# Patient Record
Sex: FEMALE | Race: WHITE | Employment: OTHER | URBAN - METROPOLITAN AREA
[De-identification: names, ages, dates, MRNs, and addresses within clinical notes are randomized per-mention and may not be internally consistent; named-entity substitution may affect disease eponyms.]

---

## 2017-04-12 ENCOUNTER — OFFICE VISIT (OUTPATIENT)
Dept: FAMILY MEDICINE CLINIC | Age: 77
End: 2017-04-12
Payer: MEDICARE

## 2017-04-12 VITALS
WEIGHT: 224 LBS | HEIGHT: 63 IN | SYSTOLIC BLOOD PRESSURE: 148 MMHG | RESPIRATION RATE: 18 BRPM | TEMPERATURE: 98.7 F | DIASTOLIC BLOOD PRESSURE: 80 MMHG | BODY MASS INDEX: 39.69 KG/M2 | HEART RATE: 96 BPM | OXYGEN SATURATION: 99 %

## 2017-04-12 DIAGNOSIS — R61 DIAPHORESIS: ICD-10-CM

## 2017-04-12 DIAGNOSIS — B02.9 HERPES ZOSTER WITHOUT COMPLICATION: Primary | ICD-10-CM

## 2017-04-12 DIAGNOSIS — R07.9 RIGHT-SIDED CHEST PAIN: ICD-10-CM

## 2017-04-12 PROCEDURE — G8417 CALC BMI ABV UP PARAM F/U: HCPCS | Performed by: NURSE PRACTITIONER

## 2017-04-12 PROCEDURE — G8399 PT W/DXA RESULTS DOCUMENT: HCPCS | Performed by: NURSE PRACTITIONER

## 2017-04-12 PROCEDURE — 1036F TOBACCO NON-USER: CPT | Performed by: NURSE PRACTITIONER

## 2017-04-12 PROCEDURE — 4040F PNEUMOC VAC/ADMIN/RCVD: CPT | Performed by: NURSE PRACTITIONER

## 2017-04-12 PROCEDURE — 1123F ACP DISCUSS/DSCN MKR DOCD: CPT | Performed by: NURSE PRACTITIONER

## 2017-04-12 PROCEDURE — 1090F PRES/ABSN URINE INCON ASSESS: CPT | Performed by: NURSE PRACTITIONER

## 2017-04-12 PROCEDURE — 3014F SCREEN MAMMO DOC REV: CPT | Performed by: NURSE PRACTITIONER

## 2017-04-12 PROCEDURE — 93000 ELECTROCARDIOGRAM COMPLETE: CPT | Performed by: NURSE PRACTITIONER

## 2017-04-12 PROCEDURE — G8427 DOCREV CUR MEDS BY ELIG CLIN: HCPCS | Performed by: NURSE PRACTITIONER

## 2017-04-12 PROCEDURE — 3017F COLORECTAL CA SCREEN DOC REV: CPT | Performed by: NURSE PRACTITIONER

## 2017-04-12 PROCEDURE — 99214 OFFICE O/P EST MOD 30 MIN: CPT | Performed by: NURSE PRACTITIONER

## 2017-04-12 RX ORDER — VALACYCLOVIR HYDROCHLORIDE 1 G/1
1000 TABLET, FILM COATED ORAL 2 TIMES DAILY
Qty: 14 TABLET | Refills: 0 | Status: SHIPPED | OUTPATIENT
Start: 2017-04-12 | End: 2017-04-19

## 2017-04-12 ASSESSMENT — ENCOUNTER SYMPTOMS
CHEST TIGHTNESS: 0
VOMITING: 0
ABDOMINAL PAIN: 0
NAUSEA: 0
SHORTNESS OF BREATH: 0
SORE THROAT: 0
DIARRHEA: 0
SINUS PRESSURE: 0
COUGH: 0
RHINORRHEA: 0
BACK PAIN: 1
TROUBLE SWALLOWING: 0

## 2018-06-15 ENCOUNTER — TELEPHONE (OUTPATIENT)
Dept: FAMILY MEDICINE CLINIC | Age: 78
End: 2018-06-15

## 2018-06-15 DIAGNOSIS — E03.9 ACQUIRED HYPOTHYROIDISM: Primary | ICD-10-CM

## 2018-06-18 ENCOUNTER — HOSPITAL ENCOUNTER (OUTPATIENT)
Age: 78
Setting detail: SPECIMEN
Discharge: HOME | End: 2018-06-18
Payer: COMMERCIAL

## 2018-06-18 DIAGNOSIS — E03.9 ACQUIRED HYPOTHYROIDISM: ICD-10-CM

## 2018-06-18 LAB
THYROXINE, FREE: 1.48 NG/DL (ref 0.93–1.7)
TSH SERPL DL<=0.05 MIU/L-ACNC: 16.51 MIU/L (ref 0.3–5)

## 2018-06-18 RX ORDER — LEVOTHYROXINE SODIUM 125 UG/1
1 CAPSULE ORAL DAILY
Qty: 30 CAPSULE | Refills: 3 | Status: SHIPPED | OUTPATIENT
Start: 2018-06-18 | End: 2018-11-16 | Stop reason: SDUPTHER

## 2018-06-20 ENCOUNTER — OFFICE VISIT (OUTPATIENT)
Dept: FAMILY MEDICINE CLINIC | Age: 78
End: 2018-06-20
Payer: MEDICARE

## 2018-06-20 VITALS
BODY MASS INDEX: 41.11 KG/M2 | SYSTOLIC BLOOD PRESSURE: 137 MMHG | OXYGEN SATURATION: 98 % | WEIGHT: 232 LBS | HEART RATE: 77 BPM | HEIGHT: 63 IN | TEMPERATURE: 98.5 F | DIASTOLIC BLOOD PRESSURE: 68 MMHG

## 2018-06-20 DIAGNOSIS — Z00.00 ROUTINE GENERAL MEDICAL EXAMINATION AT A HEALTH CARE FACILITY: ICD-10-CM

## 2018-06-20 DIAGNOSIS — Z13.220 SCREENING FOR LIPID DISORDERS: ICD-10-CM

## 2018-06-20 DIAGNOSIS — Z87.898 HISTORY OF PREDIABETES: ICD-10-CM

## 2018-06-20 DIAGNOSIS — E03.9 ACQUIRED HYPOTHYROIDISM: ICD-10-CM

## 2018-06-20 DIAGNOSIS — M25.562 ACUTE PAIN OF LEFT KNEE: Primary | ICD-10-CM

## 2018-06-20 PROCEDURE — 99213 OFFICE O/P EST LOW 20 MIN: CPT | Performed by: NURSE PRACTITIONER

## 2018-06-20 ASSESSMENT — PATIENT HEALTH QUESTIONNAIRE - PHQ9
2. FEELING DOWN, DEPRESSED OR HOPELESS: 0
SUM OF ALL RESPONSES TO PHQ9 QUESTIONS 1 & 2: 0
SUM OF ALL RESPONSES TO PHQ QUESTIONS 1-9: 0
1. LITTLE INTEREST OR PLEASURE IN DOING THINGS: 0

## 2018-06-20 ASSESSMENT — ENCOUNTER SYMPTOMS
SHORTNESS OF BREATH: 0
COLOR CHANGE: 0

## 2018-06-27 RX ORDER — IBUPROFEN 800 MG/1
800 TABLET ORAL EVERY 12 HOURS PRN
Qty: 60 TABLET | Refills: 0 | Status: SHIPPED | OUTPATIENT
Start: 2018-06-27 | End: 2019-10-28 | Stop reason: SDUPTHER

## 2018-06-27 RX ORDER — IBUPROFEN 800 MG/1
800 TABLET ORAL EVERY 12 HOURS PRN
Qty: 60 TABLET | Refills: 0 | Status: CANCELLED | OUTPATIENT
Start: 2018-06-27

## 2018-11-13 ENCOUNTER — TELEPHONE (OUTPATIENT)
Dept: FAMILY MEDICINE CLINIC | Age: 78
End: 2018-11-13

## 2018-11-14 ENCOUNTER — HOSPITAL ENCOUNTER (OUTPATIENT)
Age: 78
Setting detail: SPECIMEN
Discharge: HOME | End: 2018-11-14
Payer: MEDICARE

## 2018-11-14 DIAGNOSIS — Z87.898 HISTORY OF PREDIABETES: ICD-10-CM

## 2018-11-14 DIAGNOSIS — E03.9 ACQUIRED HYPOTHYROIDISM: ICD-10-CM

## 2018-11-14 DIAGNOSIS — Z00.00 ROUTINE GENERAL MEDICAL EXAMINATION AT A HEALTH CARE FACILITY: ICD-10-CM

## 2018-11-14 LAB
ALBUMIN SERPL-MCNC: 4.3 G/DL (ref 3.5–5.2)
ALBUMIN/GLOBULIN RATIO: 1.3 (ref 1–2.5)
ALP BLD-CCNC: 82 U/L (ref 35–104)
ALT SERPL-CCNC: 12 U/L (ref 5–33)
ANION GAP SERPL CALCULATED.3IONS-SCNC: 13 MMOL/L (ref 9–17)
AST SERPL-CCNC: 15 U/L
BILIRUB SERPL-MCNC: 1.17 MG/DL (ref 0.3–1.2)
BUN BLDV-MCNC: 9 MG/DL (ref 8–23)
BUN/CREAT BLD: ABNORMAL (ref 9–20)
CALCIUM SERPL-MCNC: 9.2 MG/DL (ref 8.6–10.4)
CHLORIDE BLD-SCNC: 102 MMOL/L (ref 98–107)
CHOLESTEROL/HDL RATIO: 4
CHOLESTEROL: 165 MG/DL
CO2: 25 MMOL/L (ref 20–31)
CREAT SERPL-MCNC: 0.59 MG/DL (ref 0.5–0.9)
ESTIMATED AVERAGE GLUCOSE: 126 MG/DL
GFR AFRICAN AMERICAN: >60 ML/MIN
GFR NON-AFRICAN AMERICAN: >60 ML/MIN
GFR SERPL CREATININE-BSD FRML MDRD: ABNORMAL ML/MIN/{1.73_M2}
GFR SERPL CREATININE-BSD FRML MDRD: ABNORMAL ML/MIN/{1.73_M2}
GLUCOSE BLD-MCNC: 116 MG/DL (ref 70–99)
HBA1C MFR BLD: 6 % (ref 4–6)
HCT VFR BLD CALC: 43.7 % (ref 36.3–47.1)
HDLC SERPL-MCNC: 41 MG/DL
HEMOGLOBIN: 14.3 G/DL (ref 11.9–15.1)
LDL CHOLESTEROL: 105 MG/DL (ref 0–130)
MCH RBC QN AUTO: 31.2 PG (ref 25.2–33.5)
MCHC RBC AUTO-ENTMCNC: 32.7 G/DL (ref 28.4–34.8)
MCV RBC AUTO: 95.4 FL (ref 82.6–102.9)
NRBC AUTOMATED: 0 PER 100 WBC
PDW BLD-RTO: 13.1 % (ref 11.8–14.4)
PLATELET # BLD: 236 K/UL (ref 138–453)
PMV BLD AUTO: 11.2 FL (ref 8.1–13.5)
POTASSIUM SERPL-SCNC: 4 MMOL/L (ref 3.7–5.3)
RBC # BLD: 4.58 M/UL (ref 3.95–5.11)
SODIUM BLD-SCNC: 140 MMOL/L (ref 135–144)
T4 TOTAL: 7.5 UG/DL (ref 4.5–12)
TOTAL PROTEIN: 7.6 G/DL (ref 6.4–8.3)
TRIGL SERPL-MCNC: 96 MG/DL
TSH SERPL DL<=0.05 MIU/L-ACNC: 4.64 MIU/L (ref 0.3–5)
VLDLC SERPL CALC-MCNC: NORMAL MG/DL (ref 1–30)
WBC # BLD: 5.9 K/UL (ref 3.5–11.3)

## 2018-11-16 ENCOUNTER — OFFICE VISIT (OUTPATIENT)
Dept: FAMILY MEDICINE CLINIC | Age: 78
End: 2018-11-16
Payer: MEDICARE

## 2018-11-16 VITALS
BODY MASS INDEX: 41.11 KG/M2 | OXYGEN SATURATION: 98 % | TEMPERATURE: 97.1 F | HEART RATE: 73 BPM | SYSTOLIC BLOOD PRESSURE: 132 MMHG | WEIGHT: 232 LBS | DIASTOLIC BLOOD PRESSURE: 65 MMHG | HEIGHT: 63 IN

## 2018-11-16 DIAGNOSIS — E03.9 ACQUIRED HYPOTHYROIDISM: ICD-10-CM

## 2018-11-16 PROCEDURE — 99213 OFFICE O/P EST LOW 20 MIN: CPT | Performed by: NURSE PRACTITIONER

## 2018-11-16 RX ORDER — LEVOTHYROXINE SODIUM 125 UG/1
1 CAPSULE ORAL DAILY
Qty: 90 CAPSULE | Refills: 3 | Status: SHIPPED | OUTPATIENT
Start: 2018-11-16 | End: 2019-10-28 | Stop reason: SDUPTHER

## 2018-11-16 ASSESSMENT — ENCOUNTER SYMPTOMS
NAUSEA: 0
COUGH: 0
VOMITING: 0
CHEST TIGHTNESS: 0
SHORTNESS OF BREATH: 0
ABDOMINAL PAIN: 0

## 2018-11-16 NOTE — PROGRESS NOTES
[facility] SPECIALTY \[facility]\"" - [place]  [address]  [place], [address]  [phone]      Toma Damon is a 70 y.o. female who presents today for her  medicalconditions/complaints as noted below. Toma Damon is c/o of Hypothyroidism (review labs. declines MMG, pneumo (waiver signed))  . HPI:   Pt here to review recent labs. She is feeling well with no other health concerns. She declines vaccines, mammogram. She has been tolerating her thyroid medication well with no SE> she is trying to work on weight loss and increase her water intake. Past Medical History:   Diagnosis Date    Hypothyroidism       Past Surgical History:   Procedure Laterality Date    CARPAL TUNNEL RELEASE      CHOLECYSTECTOMY      ECTOPIC PREGNANCY SURGERY       Family History   Problem Relation Age of Onset    No Known Problems Mother     High Blood Pressure Father     Arthritis Father      Social History   Substance Use Topics    Smoking status: Never Smoker    Smokeless tobacco: Never Used    Alcohol use No      Current Outpatient Prescriptions   Medication Sig Dispense Refill    Levothyroxine Sodium 125 MCG CAPS Take 1 tablet by mouth Daily 90 capsule 3    ibuprofen (ADVIL;MOTRIN) 800 MG tablet Take 1 tablet by mouth every 12 hours as needed for Pain (with food) 60 tablet 0     No current facility-administered medications for this visit. Allergies   Allergen Reactions    Valium [Diazepam]        Health Maintenance   Topic Date Due    DTaP/Tdap/Td vaccine (1 - Tdap) 07/06/1961    Shingles Vaccine (1 of 2 - 2 Dose Series) 07/06/1992    Pneumococcal low/med risk (1 of 2 - PCV13) 07/06/2007    Breast cancer screen  07/06/2017    Flu vaccine (1) 09/01/2018    TSH testing  11/14/2019    DEXA (modify frequency per FRAX score)  Addressed       Subjective:      Review of Systems   Constitutional: Negative for appetite change, chills, diaphoresis, fatigue, fever and unexpected weight change.    Eyes: Negative for visual disturbance. Respiratory: Negative for cough, chest tightness and shortness of breath. Cardiovascular: Positive for leg swelling (bilateral and chronic). Negative for chest pain and palpitations. Gastrointestinal: Negative for abdominal pain, nausea and vomiting. Endocrine: Negative for cold intolerance and heat intolerance. Genitourinary: Negative for difficulty urinating. Musculoskeletal: Positive for arthralgias (chronic left knee pain). Skin: Negative for rash and wound. Neurological: Negative for dizziness, light-headedness and headaches. Hematological: Negative for adenopathy. Psychiatric/Behavioral: Negative for dysphoric mood and sleep disturbance. The patient is not nervous/anxious. Objective:     Physical Exam   Constitutional: She is oriented to person, place, and time. She appears well-developed and well-nourished. No distress. HENT:   Head: Normocephalic and atraumatic. Neck: Neck supple. No JVD present. Cardiovascular: Normal rate, regular rhythm, normal heart sounds and intact distal pulses. 2+ BLE edema   Pulmonary/Chest: Effort normal and breath sounds normal.   Lymphadenopathy:     She has no cervical adenopathy. Neurological: She is alert and oriented to person, place, and time. Skin: Skin is warm and dry. She is not diaphoretic. Psychiatric: She has a normal mood and affect. Her behavior is normal. Judgment and thought content normal.   Nursing note and vitals reviewed. Assessment:       Diagnosis Orders   1.  Acquired hypothyroidism       Lab Results   Component Value Date    TSH 4.64 11/14/2018       Chemistry        Component Value Date/Time     11/14/2018 1059    K 4.0 11/14/2018 1059     11/14/2018 1059    CO2 25 11/14/2018 1059    BUN 9 11/14/2018 1059    CREATININE 0.59 11/14/2018 1059        Component Value Date/Time    CALCIUM 9.2 11/14/2018 1059    ALKPHOS 82 11/14/2018 1059    AST 15 11/14/2018 1059    ALT 12

## 2019-10-18 ENCOUNTER — TELEPHONE (OUTPATIENT)
Dept: FAMILY MEDICINE CLINIC | Age: 79
End: 2019-10-18

## 2019-10-18 DIAGNOSIS — Z13.220 SCREENING FOR LIPOID DISORDERS: ICD-10-CM

## 2019-10-18 DIAGNOSIS — Z12.5 SPECIAL SCREENING FOR MALIGNANT NEOPLASM OF PROSTATE: ICD-10-CM

## 2019-10-18 DIAGNOSIS — R73.03 PREDIABETES: ICD-10-CM

## 2019-10-18 DIAGNOSIS — Z00.00 ROUTINE GENERAL MEDICAL EXAMINATION AT A HEALTH CARE FACILITY: ICD-10-CM

## 2019-10-18 DIAGNOSIS — E03.9 ACQUIRED HYPOTHYROIDISM: Primary | ICD-10-CM

## 2019-10-24 ENCOUNTER — HOSPITAL ENCOUNTER (OUTPATIENT)
Age: 79
Setting detail: SPECIMEN
Discharge: HOME | End: 2019-10-24
Payer: MEDICARE

## 2019-10-24 DIAGNOSIS — E03.9 ACQUIRED HYPOTHYROIDISM: ICD-10-CM

## 2019-10-24 DIAGNOSIS — Z13.220 SCREENING FOR LIPOID DISORDERS: ICD-10-CM

## 2019-10-24 DIAGNOSIS — R73.03 PREDIABETES: ICD-10-CM

## 2019-10-24 DIAGNOSIS — Z00.00 ROUTINE GENERAL MEDICAL EXAMINATION AT A HEALTH CARE FACILITY: ICD-10-CM

## 2019-10-24 LAB
ANION GAP SERPL CALCULATED.3IONS-SCNC: 14 MMOL/L (ref 9–17)
BUN BLDV-MCNC: 10 MG/DL (ref 8–23)
BUN/CREAT BLD: ABNORMAL (ref 9–20)
CALCIUM SERPL-MCNC: 9.4 MG/DL (ref 8.6–10.4)
CHLORIDE BLD-SCNC: 107 MMOL/L (ref 98–107)
CHOLESTEROL/HDL RATIO: 3.8
CHOLESTEROL: 168 MG/DL
CO2: 23 MMOL/L (ref 20–31)
CREAT SERPL-MCNC: 0.56 MG/DL (ref 0.5–0.9)
ESTIMATED AVERAGE GLUCOSE: 126 MG/DL
GFR AFRICAN AMERICAN: >60 ML/MIN
GFR NON-AFRICAN AMERICAN: >60 ML/MIN
GFR SERPL CREATININE-BSD FRML MDRD: ABNORMAL ML/MIN/{1.73_M2}
GFR SERPL CREATININE-BSD FRML MDRD: ABNORMAL ML/MIN/{1.73_M2}
GLUCOSE BLD-MCNC: 119 MG/DL (ref 70–99)
HBA1C MFR BLD: 6 % (ref 4–6)
HCT VFR BLD CALC: 44.8 % (ref 36.3–47.1)
HDLC SERPL-MCNC: 44 MG/DL
HEMOGLOBIN: 14.6 G/DL (ref 11.9–15.1)
LDL CHOLESTEROL: 104 MG/DL (ref 0–130)
MCH RBC QN AUTO: 32 PG (ref 25.2–33.5)
MCHC RBC AUTO-ENTMCNC: 32.6 G/DL (ref 28.4–34.8)
MCV RBC AUTO: 98.2 FL (ref 82.6–102.9)
NRBC AUTOMATED: 0 PER 100 WBC
PDW BLD-RTO: 12.8 % (ref 11.8–14.4)
PLATELET # BLD: 207 K/UL (ref 138–453)
PMV BLD AUTO: 11.9 FL (ref 8.1–13.5)
POTASSIUM SERPL-SCNC: 4 MMOL/L (ref 3.7–5.3)
RBC # BLD: 4.56 M/UL (ref 3.95–5.11)
SODIUM BLD-SCNC: 144 MMOL/L (ref 135–144)
THYROXINE, FREE: 1.47 NG/DL (ref 0.93–1.7)
TRIGL SERPL-MCNC: 100 MG/DL
TSH SERPL DL<=0.05 MIU/L-ACNC: 4.86 MIU/L (ref 0.3–5)
VLDLC SERPL CALC-MCNC: NORMAL MG/DL (ref 1–30)
WBC # BLD: 5.5 K/UL (ref 3.5–11.3)

## 2019-10-28 ENCOUNTER — OFFICE VISIT (OUTPATIENT)
Dept: FAMILY MEDICINE CLINIC | Age: 79
End: 2019-10-28
Payer: MEDICARE

## 2019-10-28 VITALS
HEART RATE: 76 BPM | SYSTOLIC BLOOD PRESSURE: 138 MMHG | WEIGHT: 217 LBS | HEIGHT: 63 IN | BODY MASS INDEX: 38.45 KG/M2 | TEMPERATURE: 98.2 F | DIASTOLIC BLOOD PRESSURE: 62 MMHG

## 2019-10-28 DIAGNOSIS — Z00.00 ROUTINE GENERAL MEDICAL EXAMINATION AT A HEALTH CARE FACILITY: ICD-10-CM

## 2019-10-28 DIAGNOSIS — Z00.00 MEDICARE ANNUAL WELLNESS VISIT, INITIAL: Primary | ICD-10-CM

## 2019-10-28 PROCEDURE — G0438 PPPS, INITIAL VISIT: HCPCS | Performed by: NURSE PRACTITIONER

## 2019-10-28 RX ORDER — IBUPROFEN 800 MG/1
800 TABLET ORAL EVERY 12 HOURS PRN
Qty: 60 TABLET | Refills: 0 | Status: SHIPPED | OUTPATIENT
Start: 2019-10-28 | End: 2021-08-30 | Stop reason: SDUPTHER

## 2019-10-28 RX ORDER — LEVOTHYROXINE SODIUM 125 UG/1
1 CAPSULE ORAL DAILY
Qty: 90 CAPSULE | Refills: 3 | Status: SHIPPED | OUTPATIENT
Start: 2019-10-28 | End: 2021-08-30 | Stop reason: SDUPTHER

## 2019-10-28 ASSESSMENT — PATIENT HEALTH QUESTIONNAIRE - PHQ9
SUM OF ALL RESPONSES TO PHQ QUESTIONS 1-9: 0
SUM OF ALL RESPONSES TO PHQ QUESTIONS 1-9: 0
SUM OF ALL RESPONSES TO PHQ9 QUESTIONS 1 & 2: 0
1. LITTLE INTEREST OR PLEASURE IN DOING THINGS: 0
2. FEELING DOWN, DEPRESSED OR HOPELESS: 0
SUM OF ALL RESPONSES TO PHQ QUESTIONS 1-9: 0
SUM OF ALL RESPONSES TO PHQ QUESTIONS 1-9: 0

## 2019-10-28 ASSESSMENT — LIFESTYLE VARIABLES: HOW OFTEN DO YOU HAVE A DRINK CONTAINING ALCOHOL: 0

## 2021-08-20 ENCOUNTER — TELEPHONE (OUTPATIENT)
Dept: FAMILY MEDICINE CLINIC | Age: 81
End: 2021-08-20

## 2021-08-20 DIAGNOSIS — E03.9 ACQUIRED HYPOTHYROIDISM: Primary | ICD-10-CM

## 2021-08-20 DIAGNOSIS — Z13.220 SCREENING FOR LIPOID DISORDERS: ICD-10-CM

## 2021-08-20 DIAGNOSIS — R73.03 PRE-DIABETES: ICD-10-CM

## 2021-08-20 DIAGNOSIS — Z11.59 NEED FOR HEPATITIS C SCREENING TEST: ICD-10-CM

## 2021-08-20 NOTE — TELEPHONE ENCOUNTER
Patient called and scheduled an appointment with the call center for [place] on Monday at 4:45pm and would like any labs that are needed to be placed so that she can get them done before her visit. Please advise.

## 2021-08-23 ENCOUNTER — HOSPITAL ENCOUNTER (OUTPATIENT)
Age: 81
Setting detail: SPECIMEN
Discharge: HOME | End: 2021-08-23
Payer: MEDICARE

## 2021-08-23 DIAGNOSIS — R73.03 PRE-DIABETES: ICD-10-CM

## 2021-08-23 DIAGNOSIS — E03.9 ACQUIRED HYPOTHYROIDISM: ICD-10-CM

## 2021-08-23 DIAGNOSIS — Z11.59 NEED FOR HEPATITIS C SCREENING TEST: ICD-10-CM

## 2021-08-23 DIAGNOSIS — Z13.220 SCREENING FOR LIPOID DISORDERS: ICD-10-CM

## 2021-08-23 LAB
ABSOLUTE EOS #: 0.18 K/UL (ref 0–0.44)
ABSOLUTE IMMATURE GRANULOCYTE: <0.03 K/UL (ref 0–0.3)
ABSOLUTE LYMPH #: 1.52 K/UL (ref 1.1–3.7)
ABSOLUTE MONO #: 0.54 K/UL (ref 0.1–1.2)
ALBUMIN SERPL-MCNC: 4.2 G/DL (ref 3.5–5.2)
ALBUMIN/GLOBULIN RATIO: 1.4 (ref 1–2.5)
ALP BLD-CCNC: 71 U/L (ref 35–104)
ALT SERPL-CCNC: 12 U/L (ref 5–33)
ANION GAP SERPL CALCULATED.3IONS-SCNC: 12 MMOL/L (ref 9–17)
AST SERPL-CCNC: 18 U/L
BASOPHILS # BLD: 1 % (ref 0–2)
BASOPHILS ABSOLUTE: 0.08 K/UL (ref 0–0.2)
BILIRUB SERPL-MCNC: 1.02 MG/DL (ref 0.3–1.2)
BUN BLDV-MCNC: 8 MG/DL (ref 8–23)
BUN/CREAT BLD: ABNORMAL (ref 9–20)
CALCIUM SERPL-MCNC: 8.9 MG/DL (ref 8.6–10.4)
CHLORIDE BLD-SCNC: 107 MMOL/L (ref 98–107)
CHOLESTEROL/HDL RATIO: 3.6
CHOLESTEROL: 157 MG/DL
CO2: 22 MMOL/L (ref 20–31)
CREAT SERPL-MCNC: 0.57 MG/DL (ref 0.5–0.9)
DIFFERENTIAL TYPE: NORMAL
EOSINOPHILS RELATIVE PERCENT: 3 % (ref 1–4)
ESTIMATED AVERAGE GLUCOSE: 123 MG/DL
GFR AFRICAN AMERICAN: >60 ML/MIN
GFR NON-AFRICAN AMERICAN: >60 ML/MIN
GFR SERPL CREATININE-BSD FRML MDRD: ABNORMAL ML/MIN/{1.73_M2}
GFR SERPL CREATININE-BSD FRML MDRD: ABNORMAL ML/MIN/{1.73_M2}
GLUCOSE BLD-MCNC: 113 MG/DL (ref 70–99)
HBA1C MFR BLD: 5.9 % (ref 4–6)
HCT VFR BLD CALC: 41.8 % (ref 36.3–47.1)
HDLC SERPL-MCNC: 44 MG/DL
HEMOGLOBIN: 13.3 G/DL (ref 11.9–15.1)
HEPATITIS C ANTIBODY: NONREACTIVE
IMMATURE GRANULOCYTES: 0 %
LDL CHOLESTEROL: 95 MG/DL (ref 0–130)
LYMPHOCYTES # BLD: 26 % (ref 24–43)
MCH RBC QN AUTO: 31.6 PG (ref 25.2–33.5)
MCHC RBC AUTO-ENTMCNC: 31.8 G/DL (ref 28.4–34.8)
MCV RBC AUTO: 99.3 FL (ref 82.6–102.9)
MONOCYTES # BLD: 9 % (ref 3–12)
NRBC AUTOMATED: 0 PER 100 WBC
PDW BLD-RTO: 13.2 % (ref 11.8–14.4)
PLATELET # BLD: 211 K/UL (ref 138–453)
PLATELET ESTIMATE: NORMAL
PMV BLD AUTO: 11.4 FL (ref 8.1–13.5)
POTASSIUM SERPL-SCNC: 4.5 MMOL/L (ref 3.7–5.3)
RBC # BLD: 4.21 M/UL (ref 3.95–5.11)
RBC # BLD: NORMAL 10*6/UL
SEG NEUTROPHILS: 61 % (ref 36–65)
SEGMENTED NEUTROPHILS ABSOLUTE COUNT: 3.5 K/UL (ref 1.5–8.1)
SODIUM BLD-SCNC: 141 MMOL/L (ref 135–144)
THYROXINE, FREE: 1.23 NG/DL (ref 0.93–1.7)
TOTAL PROTEIN: 7.3 G/DL (ref 6.4–8.3)
TRIGL SERPL-MCNC: 90 MG/DL
TSH SERPL DL<=0.05 MIU/L-ACNC: 8.97 MIU/L (ref 0.3–5)
VLDLC SERPL CALC-MCNC: NORMAL MG/DL (ref 1–30)
WBC # BLD: 5.8 K/UL (ref 3.5–11.3)
WBC # BLD: NORMAL 10*3/UL

## 2021-08-30 ENCOUNTER — OFFICE VISIT (OUTPATIENT)
Dept: FAMILY MEDICINE CLINIC | Age: 81
End: 2021-08-30
Payer: MEDICARE

## 2021-08-30 VITALS
DIASTOLIC BLOOD PRESSURE: 87 MMHG | TEMPERATURE: 98.2 F | BODY MASS INDEX: 39.58 KG/M2 | OXYGEN SATURATION: 98 % | SYSTOLIC BLOOD PRESSURE: 136 MMHG | HEIGHT: 63 IN | HEART RATE: 84 BPM | WEIGHT: 223.4 LBS

## 2021-08-30 DIAGNOSIS — Z00.00 ROUTINE GENERAL MEDICAL EXAMINATION AT A HEALTH CARE FACILITY: Primary | ICD-10-CM

## 2021-08-30 DIAGNOSIS — E03.9 ACQUIRED HYPOTHYROIDISM: ICD-10-CM

## 2021-08-30 PROCEDURE — G0439 PPPS, SUBSEQ VISIT: HCPCS | Performed by: NURSE PRACTITIONER

## 2021-08-30 RX ORDER — IBUPROFEN 800 MG/1
800 TABLET ORAL EVERY 12 HOURS PRN
Qty: 60 TABLET | Refills: 3 | Status: SHIPPED | OUTPATIENT
Start: 2021-08-30

## 2021-08-30 RX ORDER — LEVOTHYROXINE SODIUM 125 UG/1
1 CAPSULE ORAL DAILY
Qty: 90 CAPSULE | Refills: 3 | Status: SHIPPED | OUTPATIENT
Start: 2021-08-30 | End: 2021-09-01 | Stop reason: ALTCHOICE

## 2021-08-30 ASSESSMENT — PATIENT HEALTH QUESTIONNAIRE - PHQ9
SUM OF ALL RESPONSES TO PHQ QUESTIONS 1-9: 0
SUM OF ALL RESPONSES TO PHQ9 QUESTIONS 1 & 2: 0
1. LITTLE INTEREST OR PLEASURE IN DOING THINGS: 0
2. FEELING DOWN, DEPRESSED OR HOPELESS: 0

## 2021-08-30 ASSESSMENT — LIFESTYLE VARIABLES: HOW OFTEN DO YOU HAVE A DRINK CONTAINING ALCOHOL: 0

## 2021-08-30 NOTE — PROGRESS NOTES
Visit Information    Have you changed or started any medications since your last visit including any over-the-counter medicines, vitamins, or herbal medicines? no   Have you stopped taking any of your medications? Is so, why? -  no  Are you having any side effects from any of your medications? - no    Have you seen any other physician or provider since your last visit?  no   Have you had any other diagnostic tests since your last visit?  no   Have you been seen in the emergency room and/or had an admission in a hospital since we last saw you?  no   Have you had your routine dental cleaning in the past 6 months?  no     Do you have an active MyChart account? If no, what is the barrier?   Yes    Patient Care Team:  JEFERSON Coon CNP as PCP - [place] [address], APRN - CNP as PCP - [facility]    Medical History Review  Past Medical, Family, and Social History reviewed and  contribute to the patient presenting condition    Health Maintenance   Topic Date Due    COVID-19 Vaccine (1) Never done    Pneumococcal 65+ years Vaccine (1 of 1 - PPSV23) Never done    Breast cancer screen  07/06/2017    Annual Wellness Visit (AWV)  Never done    Shingles Vaccine (1 of 2) 11/09/2021 (Originally 7/6/1992)    DTaP/Tdap/Td vaccine (1 - Tdap) 11/17/2022 (Originally [birth year]6/1961)    Flu vaccine (1) 09/01/2021    TSH testing  08/23/2022    Hepatitis C screen  Completed    DEXA (modify frequency per FRAX score)  Addressed    Hepatitis A vaccine  Aged Out    Hepatitis B vaccine  Aged Out    Hib vaccine  Aged Out    Meningococcal (ACWY) vaccine  Aged Out

## 2021-08-30 NOTE — PATIENT INSTRUCTIONS
Personalized Preventive Plan for Rigoberto Lombardo - 8/30/2021  Medicare offers a range of preventive health benefits. Some of the tests and screenings are paid in full while other may be subject to a deductible, co-insurance, and/or copay. Some of these benefits include a comprehensive review of your medical history including lifestyle, illnesses that may run in your family, and various assessments and screenings as appropriate. After reviewing your medical record and screening and assessments performed today your provider may have ordered immunizations, labs, imaging, and/or referrals for you. A list of these orders (if applicable) as well as your Preventive Care list are included within your After Visit Summary for your review. Other Preventive Recommendations:    · A preventive eye exam performed by an eye specialist is recommended every 1-2 years to screen for glaucoma; cataracts, macular degeneration, and other eye disorders. · A preventive dental visit is recommended every 6 months. · Try to get at least 150 minutes of exercise per week or 10,000 steps per day on a pedometer . · Order or download the FREE \"Exercise & Physical Activity: Your Everyday Guide\" from [employer]. Call 7-940.219.9087 or search [employer] online. · You need 7358-6628 mg of calcium and 0026-5173 IU of vitamin D per day. It is possible to meet your calcium requirement with diet alone, but a vitamin D supplement is usually necessary to meet this goal.  · When exposed to the sun, use a sunscreen that protects against both UVA and UVB radiation with an SPF of 30 or greater. Reapply every 2 to 3 hours or after sweating, drying off with a towel, or swimming. · Always wear a seat belt when traveling in a car. Always wear a helmet when riding a bicycle or motorcycle.

## 2021-08-30 NOTE — PROGRESS NOTES
Medicare Annual Wellness Visit  Name: Sarah Jimenez Date: 2021   MRN: [id number] Sex: Female   Age: 80 y.o. Ethnicity: Non- / Non    : 1940 Race: White (non-)      Jack Trujillo is here for Hypothyroidism (discuss lab results) and Medicare AWV    Screenings for behavioral, psychosocial and functional/safety risks, and cognitive dysfunction are all negative except as indicated below. These results, as well as other patient data from the [address] form, are documented in Flowsheets linked to this Encounter. Allergies   Allergen Reactions    Valium [Diazepam]        Prior to Visit Medications    Medication Sig Taking? Authorizing Provider   Levothyroxine Sodium 125 MCG CAPS Take 1 tablet by mouth Daily Yes JEFERSON Fuller CNP   ibuprofen (ADVIL;MOTRIN) 800 MG tablet Take 1 tablet by mouth every 12 hours as needed for Pain (with food)  JEFERSON Fuller CNP       Past Medical History:   Diagnosis Date    Hypothyroidism        Past Surgical History:   Procedure Laterality Date    CARPAL TUNNEL RELEASE      CHOLECYSTECTOMY      ECTOPIC PREGNANCY SURGERY         Family History   Problem Relation Age of Onset    No Known Problems Mother     High Blood Pressure Father     Arthritis Father        CareTeam (Including outside providers/suppliers regularly involved in providing care):   Patient Care Team:  JEFERSON Fuller CNP as PCP - General  JEFERSON Fuller CNP as PCP - [facility] Empaneled Provider    Wt Readings from Last 3 Encounters:   21 223 lb 6.4 oz (101.3 kg)   10/28/19 217 lb (98.4 kg)   18 232 lb (105.2 kg)     Vitals:    21 1534   BP: 136/87   Site: Right Upper Arm   Position: Sitting   Cuff Size: Medium Adult   Pulse: 84   Temp: 98.2 °F (36.8 °C)   TempSrc: Tympanic   SpO2: 98%   Weight: 223 lb 6.4 oz (101.3 kg)   Height: 5' 3\" (1.6 m)     Body mass index is 39.57 kg/m².     Based upon direct observation of the patient, evaluation of cognition reveals recent and remote memory intact. General Appearance: alert and oriented to person, place and time, well-developed and well-nourished, in no acute distress  Pulmonary/Chest: clear to auscultation bilaterally- no wheezes, rales or rhonchi, normal air movement, no respiratory distress  Cardiovascular: normal rate, normal S1 and S2, no gallops, intact distal pulses and no carotid bruits  Musculoskeletal: normal range of motion, no joint swelling, deformity or tenderness    Patient's complete Health Risk Assessment and screening values have been reviewed and are found in Flowsheets. The following problems were reviewed today and where indicated follow up appointments were made and/or referrals ordered. Positive Risk Factor Screenings with Interventions:          General Health and ACP:  General  In general, how would you say your health is?: Very Good  In the past 7 days, have you experienced any of the following?  New or Increased Pain, New or Increased Fatigue, Loneliness, Social Isolation, Stress or Anger?: None of These  Do you get the social and emotional support that you need?: Yes  Do you have a Living Will?: Yes  Advance Directives     Power of [address] [place] [address] Will ACP-Advance Directive ACP-Power of     Not on File Not on File Not on File Not on File      General Health Risk Interventions:  · No Living Will: ACP documents already completed- patient asked to provide copy to the office    Health Habits/Nutrition:  Health Habits/Nutrition  Do you exercise for at least 20 minutes 2-3 times per week?: (!) No  Have you lost any weight without trying in the past 3 months?: No  Do you eat only one meal per day?: No (doesnt eat big amounts)  Have you seen the dentist within the past year?: (!) No  Body mass index: (!) 39.57  Health Habits/Nutrition Interventions:  · Inadequate physical activity:  patient is not ready to increase his/her physical activity level at this time  · Nutritional issues:  patient is not ready to address his/her nutritional/weight issues at this time  · Dental exam overdue:  patient encouraged to make appointment with his/her dentist    Hearing/Vision:  No exam data present  Hearing/Vision  Do you or your family notice any trouble with your hearing that hasn't been managed with hearing aids?: (!) Yes  Do you have difficulty driving, watching TV, or doing any of your daily activities because of your eyesight?: No  Have you had an eye exam within the past year?: Appointment is scheduled  Hearing/Vision Interventions:  · Hearing concerns:  patient declines any further evaluation/treatment for hearing issues      Personalized Preventive Plan   Current Health Maintenance Status    There is no immunization history on file for this patient. Health Maintenance   Topic Date Due    COVID-19 Vaccine (1) Never done    Pneumococcal 65+ years Vaccine (1 of 1 - PPSV23) Never done    Breast cancer screen  07/06/2017    Annual Wellness Visit (AWV)  Never done    Shingles Vaccine (1 of 2) 11/09/2021 (Originally 7/6/1992)    DTaP/Tdap/Td vaccine (1 - Tdap) 11/17/2022 (Originally 7/6/1961)    Flu vaccine (1) 09/01/2021    TSH testing  08/23/2022    Hepatitis C screen  Completed    DEXA (modify frequency per FRAX score)  Addressed    Hepatitis A vaccine  Aged Out    Hepatitis B vaccine  Aged Out    Hib vaccine  Aged Out    Meningococcal (ACWY) vaccine  Aged Out     Recommendations for [employer] Due: see orders and patient instructions/AVS.  . Recommended screening schedule for the next 5-10 years is provided to the patient in written form: see Patient Instructions/AVS.    Ciera Gutierrez was seen today for hypothyroidism and medicare awv.     Diagnoses and all orders for this visit:    Routine general medical examination at a health care facility             Reviewed all labs with pt  She is doing well living on her own with no needs  Lab Results Component Value Date    CHOL 157 08/23/2021    CHOL 168 10/24/2019    CHOL 165 11/14/2018     Lab Results   Component Value Date    TRIG 90 08/23/2021    TRIG 100 10/24/2019    TRIG 96 11/14/2018     Lab Results   Component Value Date    HDL 44 08/23/2021    HDL 44 10/24/2019    HDL 41 11/14/2018     Lab Results   Component Value Date    LDLCHOLESTEROL 95 08/23/2021    LDLCHOLESTEROL 104 10/24/2019    LDLCHOLESTEROL 105 11/14/2018     Lab Results   Component Value Date    VLDL NOT REPORTED 08/23/2021    VLDL NOT REPORTED 10/24/2019    VLDL NOT REPORTED 11/14/2018     Lab Results   Component Value Date    CHOLHDLRATIO 3.6 08/23/2021    CHOLHDLRATIO 3.8 10/24/2019    CHOLHDLRATIO 4.0 11/14/2018       Chemistry        Component Value Date/Time     08/23/2021 0940    K 4.5 08/23/2021 0940     08/23/2021 0940    CO2 22 08/23/2021 0940    BUN 8 08/23/2021 0940    CREATININE 0.57 08/23/2021 0940        Component Value Date/Time    CALCIUM 8.9 08/23/2021 0940    ALKPHOS 71 08/23/2021 0940    AST 18 08/23/2021 0940    ALT 12 08/23/2021 0940    BILITOT 1.02 08/23/2021 0940        \  Lab Results   Component Value Date    WBC 5.8 08/23/2021    HGB 13.3 08/23/2021    HCT 41.8 08/23/2021    MCV 99.3 08/23/2021     08/23/2021     Lab Results   Component Value Date    LABA1C 5.9 08/23/2021     Lab Results   Component Value Date     08/23/2021     Lab Results   Component Value Date    TSH 8.97 (H) 08/23/2021     Pt was not taking thyroid med consistently until about 2 weeks ago, will have her restart QD and recheck labs in 2 months  Patient advised to follow heart healthy, low fat  diet and 150 minutes of cardiovascular exercise per week

## 2021-08-31 ENCOUNTER — TELEPHONE (OUTPATIENT)
Dept: FAMILY MEDICINE CLINIC | Age: 81
End: 2021-08-31

## 2021-08-31 NOTE — TELEPHONE ENCOUNTER
PA to [facility] for Levothyroxine sodium. PA states this is preferred on formulary but still requiring PA? ?

## 2021-09-01 RX ORDER — LEVOTHYROXINE SODIUM 0.12 MG/1
125 TABLET ORAL DAILY
Qty: 90 TABLET | Refills: 1 | Status: SHIPPED | OUTPATIENT
Start: 2021-09-01 | End: 2022-02-27

## 2021-09-01 NOTE — TELEPHONE ENCOUNTER
Patient called stating that the script needs to be tablets and that is why it is not getting accepted through her insurance. Patient contacted the pharmacy and they advised her to communicate this with jennifer to resend the script to [facility].

## 2022-02-27 RX ORDER — LEVOTHYROXINE SODIUM 0.12 MG/1
TABLET ORAL
Qty: 30 TABLET | Refills: 0 | Status: SHIPPED | OUTPATIENT
Start: 2022-02-27 | End: 2022-03-02 | Stop reason: SDUPTHER

## 2022-02-27 NOTE — TELEPHONE ENCOUNTER
Rose Coto is calling to request a refill on the following medication(s):    Medication Request:  Requested Prescriptions     Pending Prescriptions Disp Refills    levothyroxine (SYNTHROID) 125 MCG tablet [Pharmacy Med Name: LEVOTHYROXINE 125 MCG TABLET] 90 tablet 1     Sig: TAKE ONE TABLET BY MOUTH DAILY       Last Visit Date (If Applicable):  7/27/1109    Next Visit Date:    Visit date not found

## 2022-03-01 ENCOUNTER — HOSPITAL ENCOUNTER (OUTPATIENT)
Age: 82
Setting detail: SPECIMEN
Discharge: HOME | End: 2022-03-01

## 2022-03-01 DIAGNOSIS — E03.9 ACQUIRED HYPOTHYROIDISM: ICD-10-CM

## 2022-03-01 LAB
THYROXINE, FREE: 1.29 NG/DL (ref 0.93–1.7)
TSH SERPL DL<=0.05 MIU/L-ACNC: 7.86 MIU/L (ref 0.3–5)

## 2022-03-02 RX ORDER — LEVOTHYROXINE SODIUM 0.12 MG/1
TABLET ORAL
Qty: 30 TABLET | Refills: 11 | Status: SHIPPED | OUTPATIENT
Start: 2022-03-02 | End: 2022-03-25 | Stop reason: SDUPTHER

## 2022-03-25 ENCOUNTER — TELEPHONE (OUTPATIENT)
Dept: FAMILY MEDICINE CLINIC | Age: 82
End: 2022-03-25

## 2022-03-25 RX ORDER — LEVOTHYROXINE SODIUM 0.12 MG/1
TABLET ORAL
Qty: 90 TABLET | Refills: 3 | Status: SHIPPED | OUTPATIENT
Start: 2022-03-25

## 2022-03-25 NOTE — TELEPHONE ENCOUNTER
----- Message from Tata Kennedy sent at 3/25/2022  1:48 PM EDT -----  Subject: Message to Provider    QUESTIONS  Information for Provider? Patient called asking should she take another   test for thyroid medication or renew for another 3 months she has one week   left.   ---------------------------------------------------------------------------  --------------  CALL BACK INFO  What is the best way for the office to contact you? OK to leave message on   voicemail  Preferred Call Back Phone Number? [phone]  ---------------------------------------------------------------------------  --------------  SCRIPT ANSWERS  Relationship to Patient?  Self

## 2023-09-18 ENCOUNTER — TELEPHONE (OUTPATIENT)
Dept: FAMILY MEDICINE CLINIC | Age: 83
End: 2023-09-18

## 2023-09-18 DIAGNOSIS — E03.9 ACQUIRED HYPOTHYROIDISM: Primary | ICD-10-CM

## 2023-09-18 DIAGNOSIS — Z13.220 SCREENING FOR LIPOID DISORDERS: ICD-10-CM

## 2023-09-18 NOTE — TELEPHONE ENCOUNTER
----- Message from Kami Miller sent at 9/18/2023 10:20 AM EDT -----  Subject: Message to Provider    QUESTIONS  Information for Provider? Pt has a physical scheduled for 9/21 and would   like to have labs done prior to coming in for the appointment. There are   no orders in the system. Pt would like a call back  ---------------------------------------------------------------------------  --------------  Oliverio POLLARD  [phone]; OK to leave message on voicemail  ---------------------------------------------------------------------------  --------------  SCRIPT ANSWERS  Relationship to Patient?  Self

## 2023-09-21 ENCOUNTER — OFFICE VISIT (OUTPATIENT)
Dept: FAMILY MEDICINE CLINIC | Age: 83
End: 2023-09-21
Payer: MEDICARE

## 2023-09-21 VITALS
SYSTOLIC BLOOD PRESSURE: 134 MMHG | BODY MASS INDEX: 39.9 KG/M2 | TEMPERATURE: 98.1 F | WEIGHT: 225.2 LBS | DIASTOLIC BLOOD PRESSURE: 64 MMHG | OXYGEN SATURATION: 98 % | HEIGHT: 63 IN | HEART RATE: 76 BPM

## 2023-09-21 DIAGNOSIS — E03.9 ACQUIRED HYPOTHYROIDISM: ICD-10-CM

## 2023-09-21 DIAGNOSIS — Z00.00 MEDICARE ANNUAL WELLNESS VISIT, SUBSEQUENT: Primary | ICD-10-CM

## 2023-09-21 PROCEDURE — G0439 PPPS, SUBSEQ VISIT: HCPCS | Performed by: NURSE PRACTITIONER

## 2023-09-21 PROCEDURE — 1123F ACP DISCUSS/DSCN MKR DOCD: CPT | Performed by: NURSE PRACTITIONER

## 2023-09-21 RX ORDER — MULTIVIT-MIN/IRON/FOLIC ACID/K 18-600-40
CAPSULE ORAL
COMMUNITY

## 2023-09-21 SDOH — ECONOMIC STABILITY: FOOD INSECURITY: WITHIN THE PAST 12 MONTHS, THE FOOD YOU BOUGHT JUST DIDN'T LAST AND YOU DIDN'T HAVE MONEY TO GET MORE.: NEVER TRUE

## 2023-09-21 SDOH — ECONOMIC STABILITY: FOOD INSECURITY: WITHIN THE PAST 12 MONTHS, YOU WORRIED THAT YOUR FOOD WOULD RUN OUT BEFORE YOU GOT MONEY TO BUY MORE.: NEVER TRUE

## 2023-09-21 SDOH — ECONOMIC STABILITY: INCOME INSECURITY: HOW HARD IS IT FOR YOU TO PAY FOR THE VERY BASICS LIKE FOOD, HOUSING, MEDICAL CARE, AND HEATING?: NOT HARD AT ALL

## 2023-09-21 SDOH — ECONOMIC STABILITY: HOUSING INSECURITY
IN THE LAST 12 MONTHS, WAS THERE A TIME WHEN YOU DID NOT HAVE A STEADY PLACE TO SLEEP OR SLEPT IN A SHELTER (INCLUDING NOW)?: NO

## 2023-09-21 ASSESSMENT — LIFESTYLE VARIABLES
HOW MANY STANDARD DRINKS CONTAINING ALCOHOL DO YOU HAVE ON A TYPICAL DAY: PATIENT DOES NOT DRINK
HOW OFTEN DO YOU HAVE A DRINK CONTAINING ALCOHOL: NEVER

## 2023-09-21 ASSESSMENT — PATIENT HEALTH QUESTIONNAIRE - PHQ9
SUM OF ALL RESPONSES TO PHQ9 QUESTIONS 1 & 2: 0
2. FEELING DOWN, DEPRESSED OR HOPELESS: 0
SUM OF ALL RESPONSES TO PHQ QUESTIONS 1-9: 0
1. LITTLE INTEREST OR PLEASURE IN DOING THINGS: 0

## 2023-09-21 NOTE — PROGRESS NOTES
Medicare Annual Wellness Visit    Amber Julien is here for Medicare AWV (States from waist down she is cold and waist up warm, going on for a couple of weeks) and Thyroid Problem (Stopped thyroid med because of all the side effects she read about)    Assessment & Plan   Medicare annual wellness visit, subsequent  Declines all vaccines  Patient advised to follow a low carb, low sugar, healthy fat ( avocados, nuts, olive oil etc.) diet and 150 minutes of cardiovascular exercise per week   Declines ortho consult for knee pain  Advised to re join silver sneakers  Dentist q6months    Acquired hypothyroidism  Wt Readings from Last 3 Encounters:   09/21/23 225 lb 3.2 oz (102.2 kg)   08/30/21 223 lb 6.4 oz (101.3 kg)   10/28/19 217 lb (98.4 kg)     BP Readings from Last 3 Encounters:   09/21/23 134/64   08/30/21 136/87   10/28/19 138/62     Check labs already ordered and if abnormal TSH will consider armour thyroid as she does not want levothyroxine    Recommendations for Preventive Services Due: see orders and patient instructions/AVS.  Recommended screening schedule for the next 5-10 years is provided to the patient in written form: see Patient Instructions/AVS.     No follow-ups on file. Subjective   States she stopped all thyroid medication at June 2023 due to possible side effects of constipation, itchiness and weight gain. She has been on this medication since the 1980s. States she is not as active as she would like to be as she has chronic knee pain and low back pain. She did try water aerobics but states she is no longer interested in doing anything about this. States she takes aspirin on occasion for joint pain. Otherwise not taking any other medications except vitamin supplements including vitamin D collagen and K2. States she would like routine blood work orders. She declines any vaccines due today. Please note that this chart was generated using voice recognition Dragon dictation software.

## 2023-09-21 NOTE — PROGRESS NOTES
Visit Information    Have you changed or started any medications since your last visit including any over-the-counter medicines, vitamins, or herbal medicines? no   Have you stopped taking any of your medications? Is so, why? -  no  Are you having any side effects from any of your medications? - no    Have you seen any other physician or provider since your last visit?  no   Have you had any other diagnostic tests since your last visit?  no   Have you been seen in the emergency room and/or had an admission in a hospital since we last saw you?  no   Have you had your routine dental cleaning in the past 6 months?  no     Do you have an active MyChart account? If no, what is the barrier?   Yes    Patient Care Team:  Diann Valles, APRN - CNP as PCP - [address] [place] [address],[address], APRN - CNP as PCP - [facility] Provider    Medical History Review  Past Medical, Family, and Social History reviewed and  contribute to the patient presenting condition    Health Maintenance   Topic Date Due    COVID-19 Vaccine (1) Never done    Depression Screen  Never done    DTaP/Tdap/Td vaccine (1 - Tdap) Never done    Shingles vaccine (1 of 2) Never done    Annual Wellness Visit (AWV)  08/31/2022    Flu vaccine (1) Never done    Pneumococcal 65+ years Vaccine (1 - PCV) 09/16/2026 (Originally 7/6/2007)    DEXA (modify frequency per FRAX score)  Addressed    Hepatitis A vaccine  Aged Out    Hepatitis B vaccine  Aged Out    Hib vaccine  Aged Out    Meningococcal (ACWY) vaccine  Aged Out    Breast cancer screen  Discontinued    Hepatitis C screen  Discontinued

## 2023-09-22 ENCOUNTER — HOSPITAL ENCOUNTER (OUTPATIENT)
Age: 83
Setting detail: SPECIMEN
Discharge: HOME | End: 2023-09-22

## 2023-09-22 DIAGNOSIS — Z13.220 SCREENING FOR LIPOID DISORDERS: ICD-10-CM

## 2023-09-22 DIAGNOSIS — E03.9 ACQUIRED HYPOTHYROIDISM: ICD-10-CM

## 2023-09-22 LAB
ANION GAP SERPL CALCULATED.3IONS-SCNC: 10 MMOL/L (ref 9–17)
BUN SERPL-MCNC: 9 MG/DL (ref 8–23)
CALCIUM SERPL-MCNC: 9.5 MG/DL (ref 8.6–10.4)
CHLORIDE SERPL-SCNC: 102 MMOL/L (ref 98–107)
CHOLEST SERPL-MCNC: 262 MG/DL
CHOLESTEROL/HDL RATIO: 4.8
CO2 SERPL-SCNC: 27 MMOL/L (ref 20–31)
CREAT SERPL-MCNC: 0.9 MG/DL (ref 0.5–0.9)
ERYTHROCYTE [DISTWIDTH] IN BLOOD BY AUTOMATED COUNT: 14 % (ref 11.8–14.4)
GFR SERPL CREATININE-BSD FRML MDRD: >60 ML/MIN/1.73M2
GLUCOSE SERPL-MCNC: 118 MG/DL (ref 70–99)
HCT VFR BLD AUTO: 42.6 % (ref 36.3–47.1)
HDLC SERPL-MCNC: 55 MG/DL
HGB BLD-MCNC: 13.9 G/DL (ref 11.9–15.1)
LDLC SERPL CALC-MCNC: 181 MG/DL (ref 0–130)
MCH RBC QN AUTO: 32.9 PG (ref 25.2–33.5)
MCHC RBC AUTO-ENTMCNC: 32.6 G/DL (ref 28.4–34.8)
MCV RBC AUTO: 100.9 FL (ref 82.6–102.9)
NRBC BLD-RTO: 0 PER 100 WBC
PLATELET # BLD AUTO: 227 K/UL (ref 138–453)
PMV BLD AUTO: 10.4 FL (ref 8.1–13.5)
POTASSIUM SERPL-SCNC: 4.4 MMOL/L (ref 3.7–5.3)
RBC # BLD AUTO: 4.22 M/UL (ref 3.95–5.11)
SODIUM SERPL-SCNC: 139 MMOL/L (ref 135–144)
T4 FREE SERPL-MCNC: 0.1 NG/DL (ref 0.9–1.7)
TRIGL SERPL-MCNC: 132 MG/DL
TSH SERPL DL<=0.05 MIU/L-ACNC: 149.4 UIU/ML (ref 0.3–5)
WBC OTHER # BLD: 7.3 K/UL (ref 3.5–11.3)

## 2023-09-24 DIAGNOSIS — E03.9 ACQUIRED HYPOTHYROIDISM: Primary | ICD-10-CM

## 2023-09-24 RX ORDER — THYROID 60 MG/1
60 TABLET ORAL DAILY
Qty: 30 TABLET | Refills: 3 | Status: SHIPPED | OUTPATIENT
Start: 2023-09-24 | End: 2023-09-25 | Stop reason: ALTCHOICE

## 2023-09-25 DIAGNOSIS — E03.9 ACQUIRED HYPOTHYROIDISM: Primary | ICD-10-CM

## 2023-09-25 RX ORDER — THYROID 30 MG/1
30 TABLET ORAL DAILY
Qty: 30 TABLET | Refills: 3 | Status: SHIPPED | OUTPATIENT
Start: 2023-09-25

## 2023-10-03 ENCOUNTER — OFFICE VISIT (OUTPATIENT)
Dept: FAMILY MEDICINE CLINIC | Age: 83
End: 2023-10-03
Payer: MEDICARE

## 2023-10-03 VITALS
BODY MASS INDEX: 39.87 KG/M2 | HEART RATE: 79 BPM | OXYGEN SATURATION: 98 % | WEIGHT: 225 LBS | DIASTOLIC BLOOD PRESSURE: 70 MMHG | TEMPERATURE: 97.8 F | SYSTOLIC BLOOD PRESSURE: 138 MMHG | HEIGHT: 63 IN

## 2023-10-03 DIAGNOSIS — E03.9 ACQUIRED HYPOTHYROIDISM: ICD-10-CM

## 2023-10-03 DIAGNOSIS — E78.2 MIXED HYPERLIPIDEMIA: Primary | ICD-10-CM

## 2023-10-03 PROCEDURE — 99213 OFFICE O/P EST LOW 20 MIN: CPT | Performed by: NURSE PRACTITIONER

## 2023-10-03 PROCEDURE — 1123F ACP DISCUSS/DSCN MKR DOCD: CPT | Performed by: NURSE PRACTITIONER

## 2023-10-03 RX ORDER — MAGNESIUM GLUCONATE 27 MG(500)
500 TABLET ORAL DAILY
COMMUNITY

## 2023-10-03 RX ORDER — MULTIVITAMIN,THER AND MINERALS
1 TABLET ORAL DAILY
COMMUNITY

## 2023-10-03 ASSESSMENT — ENCOUNTER SYMPTOMS
VOMITING: 0
NAUSEA: 0
SHORTNESS OF BREATH: 0
COUGH: 0
ABDOMINAL PAIN: 0
CHEST TIGHTNESS: 0

## 2023-10-03 NOTE — PROGRESS NOTES
Visit Information    Have you changed or started any medications since your last visit including any over-the-counter medicines, vitamins, or herbal medicines? no   Have you stopped taking any of your medications? Is so, why? -  no  Are you having any side effects from any of your medications? - no    Have you seen any other physician or provider since your last visit?  no   Have you had any other diagnostic tests since your last visit?  no   Have you been seen in the emergency room and/or had an admission in a hospital since we last saw you?  no   Have you had your routine dental cleaning in the past 6 months?  no     Do you have an active MyChart account? If no, what is the barrier?   Yes    Patient Care Team:  [facility], JEFERSON - CNP as PCP - [address] [place] [address],[address], APRN - CNP as PCP - [facility] Provider    Medical History Review  Past Medical, Family, and Social History reviewed and  contribute to the patient presenting condition    Health Maintenance   Topic Date Due    COVID-19 Vaccine (1) Never done    Shingles vaccine (1 of 2) Never done    DTaP/Tdap/Td vaccine (1 - Tdap) 10/12/2023 (Originally 7[birth year]6/1961)    Flu vaccine (1) 09/21/2024 (Originally 8/1/2023)    Pneumococcal 65+ years Vaccine (1 - PCV) 09/16/2026 (Originally 7/6/2007)    Depression Screen  09/21/2024    Annual Wellness Visit (AWV)  09/21/2024    DEXA (modify frequency per FRAX score)  Addressed    Hepatitis A vaccine  Aged Out    Hepatitis B vaccine  Aged Out    Hib vaccine  Aged Out    Meningococcal (ACWY) vaccine  Aged Out    Breast cancer screen  Discontinued    Hepatitis C screen  Discontinued
Component Value Date/Time    CALCIUM 9.5 09/22/2023 0914    ALKPHOS 71 08/23/2021 0940    AST 18 08/23/2021 0940    ALT 12 08/23/2021 0940    BILITOT 1.02 08/23/2021 0940          Plan:      Return in about 6 months (around 4/3/2024), or if symptoms worsen or fail to improve. Orders Placed This Encounter   Procedures    Lipid Panel     PT FASTING 8-12 HOURS     Standing Status:   Future     Standing Expiration Date:   10/2/2024     Order Specific Question:   Is Patient Fasting?/# of Hours     Answer:   8-12 HOURS     Thyroid:  Continue same dose recently prescribed and already feeling  better  Continue same dose for now and recheck in 8 weeks    Lipids:  Has not had any diet changes and not eating fried foods  Recheck labs in 8 weeks as could be abnormal d/t thyroid    Patient given educational materials - see patient instructions. Discussed use,benefit, and side effects of prescribed medications. All patient questions answered. Pt voiced understanding. Reviewed health maintenance. Instructed to continue currentmedications, diet and exercise.     Electronically signed by JEFERSON Koehler CNP,CNP on 10/3/2023 at 11:38 AM

## 2023-12-12 ENCOUNTER — HOSPITAL ENCOUNTER (OUTPATIENT)
Age: 83
Setting detail: SPECIMEN
Discharge: HOME | End: 2023-12-12

## 2023-12-12 DIAGNOSIS — E03.9 ACQUIRED HYPOTHYROIDISM: ICD-10-CM

## 2023-12-12 DIAGNOSIS — E78.2 MIXED HYPERLIPIDEMIA: ICD-10-CM

## 2023-12-12 LAB
CHOLEST SERPL-MCNC: 200 MG/DL
CHOLESTEROL/HDL RATIO: 4.4
HDLC SERPL-MCNC: 45 MG/DL
LDLC SERPL CALC-MCNC: 131 MG/DL (ref 0–130)
T3FREE SERPL-MCNC: 1.64 PG/ML (ref 2.02–4.43)
T4 FREE SERPL-MCNC: 0.4 NG/DL (ref 0.9–1.7)
TRIGL SERPL-MCNC: 119 MG/DL
TSH SERPL DL<=0.05 MIU/L-ACNC: 89.87 UIU/ML (ref 0.3–5)

## 2023-12-13 ENCOUNTER — OFFICE VISIT (OUTPATIENT)
Dept: FAMILY MEDICINE CLINIC | Age: 83
End: 2023-12-13
Payer: MEDICARE

## 2023-12-13 VITALS
BODY MASS INDEX: 38.45 KG/M2 | HEART RATE: 74 BPM | OXYGEN SATURATION: 96 % | WEIGHT: 217 LBS | SYSTOLIC BLOOD PRESSURE: 130 MMHG | HEIGHT: 63 IN | DIASTOLIC BLOOD PRESSURE: 70 MMHG | TEMPERATURE: 97.7 F

## 2023-12-13 DIAGNOSIS — E03.9 ACQUIRED HYPOTHYROIDISM: Primary | ICD-10-CM

## 2023-12-13 DIAGNOSIS — E78.2 MIXED HYPERLIPIDEMIA: ICD-10-CM

## 2023-12-13 DIAGNOSIS — R60.9 NON-PITTING EDEMA: ICD-10-CM

## 2023-12-13 PROCEDURE — 99213 OFFICE O/P EST LOW 20 MIN: CPT | Performed by: NURSE PRACTITIONER

## 2023-12-13 PROCEDURE — 1123F ACP DISCUSS/DSCN MKR DOCD: CPT | Performed by: NURSE PRACTITIONER

## 2023-12-13 RX ORDER — THYROID 60 MG/1
60 TABLET ORAL DAILY
Qty: 30 TABLET | Refills: 3 | Status: SHIPPED | OUTPATIENT
Start: 2023-12-13

## 2023-12-13 ASSESSMENT — ENCOUNTER SYMPTOMS
HAIR LOSS: 1
DIARRHEA: 0
COUGH: 0
CHEST TIGHTNESS: 0
SINUS PRESSURE: 0
SORE THROAT: 0
COLOR CHANGE: 0
CONSTIPATION: 1
NAUSEA: 0
ABDOMINAL PAIN: 0
SHORTNESS OF BREATH: 0

## 2024-07-23 DIAGNOSIS — E03.9 ACQUIRED HYPOTHYROIDISM: ICD-10-CM

## 2024-07-23 RX ORDER — THYROID 60 MG/1
60 TABLET ORAL DAILY
Qty: 30 TABLET | Refills: 0 | Status: SHIPPED | OUTPATIENT
Start: 2024-07-23

## 2024-08-05 ENCOUNTER — OFFICE VISIT (OUTPATIENT)
Dept: FAMILY MEDICINE CLINIC | Age: 84
End: 2024-08-05
Payer: MEDICARE

## 2024-08-05 VITALS
DIASTOLIC BLOOD PRESSURE: 74 MMHG | HEIGHT: 63 IN | WEIGHT: 218.2 LBS | OXYGEN SATURATION: 97 % | SYSTOLIC BLOOD PRESSURE: 132 MMHG | BODY MASS INDEX: 38.66 KG/M2 | TEMPERATURE: 98.6 F | HEART RATE: 77 BPM

## 2024-08-05 DIAGNOSIS — Z00.00 MEDICARE ANNUAL WELLNESS VISIT, SUBSEQUENT: Primary | ICD-10-CM

## 2024-08-05 DIAGNOSIS — E03.9 ACQUIRED HYPOTHYROIDISM: ICD-10-CM

## 2024-08-05 LAB — TSH SERPL DL<=0.05 MIU/L-ACNC: 46.15 MIU/L (ref 0.34–5.6)

## 2024-08-05 PROCEDURE — G0439 PPPS, SUBSEQ VISIT: HCPCS | Performed by: NURSE PRACTITIONER

## 2024-08-05 PROCEDURE — 1123F ACP DISCUSS/DSCN MKR DOCD: CPT | Performed by: NURSE PRACTITIONER

## 2024-08-05 ASSESSMENT — PATIENT HEALTH QUESTIONNAIRE - PHQ9
1. LITTLE INTEREST OR PLEASURE IN DOING THINGS: NOT AT ALL
SUM OF ALL RESPONSES TO PHQ QUESTIONS 1-9: 0
2. FEELING DOWN, DEPRESSED OR HOPELESS: NOT AT ALL
SUM OF ALL RESPONSES TO PHQ9 QUESTIONS 1 & 2: 0
SUM OF ALL RESPONSES TO PHQ QUESTIONS 1-9: 0

## 2024-08-05 NOTE — PATIENT INSTRUCTIONS
Learning About Being Active as an Older Adult  Why is being active important as you get older?     Being active is one of the best things you can do for your health. And it's never too late to start. Being active--or getting active, if you aren't already--has definite benefits. It can:  Give you more energy,  Keep your mind sharp.  Improve balance to reduce your risk of falls.  Help you manage chronic illness with fewer medicines.  No matter how old you are, how fit you are, or what health problems you have, there is a form of activity that will work for you. And the more physical activity you can do, the better your overall health will be.  What kinds of activity can help you stay healthy?  Being more active will make your daily activities easier. Physical activity includes planned exercise and things you do in daily life. There are four types of activity:  Aerobic.  Doing aerobic activity makes your heart and lungs strong.  Includes walking, dancing, and gardening.  Aim for at least 2½ hours spread throughout the week.  It improves your energy and can help you sleep better.  Muscle-strengthening.  This type of activity can help maintain muscle and strengthen bones.  Includes climbing stairs, using resistance bands, and lifting or carrying heavy loads.  Aim for at least twice a week.  It can help protect the knees and other joints.  Stretching.  Stretching gives you better range of motion in joints and muscles.  Includes upper arm stretches, calf stretches, and gentle yoga.  Aim for at least twice a week, preferably after your muscles are warmed up from other activities.  It can help you function better in daily life.  Balancing.  This helps you stay coordinated and have good posture.  Includes heel-to-toe walking, candice chi, and certain types of yoga.  Aim for at least 3 days a week.  It can reduce your risk of falling.  Even if you have a hard time meeting the recommendations, it's better to be more active

## 2024-08-05 NOTE — PROGRESS NOTES
Visit Information    Have you changed or started any medications since your last visit including any over-the-counter medicines, vitamins, or herbal medicines? no   Have you stopped taking any of your medications? Is so, why? -  no  Are you having any side effects from any of your medications? - no    Have you seen any other physician or provider since your last visit?  no   Have you had any other diagnostic tests since your last visit?  no   Have you been seen in the emergency room and/or had an admission in a hospital since we last saw you?  no   Have you had your routine dental cleaning in the past 6 months?  no     Do you have an active MyChart account? If no, what is the barrier?  Yes    Patient Care Team:  Eloisa Mesa APRN - CNP as PCP - General  Eloisa Mesa APRN - CNP as PCP - Empaneled Provider    Medical History Review  Past Medical, Family, and Social History reviewed and  contribute to the patient presenting condition    Health Maintenance   Topic Date Due    COVID-19 Vaccine (1) Never done    DTaP/Tdap/Td vaccine (1 - Tdap) Never done    Shingles vaccine (1 of 2) Never done    Respiratory Syncytial Virus (RSV) Pregnant or age 60 yrs+ (1 - 1-dose 60+ series) Never done    Annual Wellness Visit (Medicare Advantage)  01/01/2024    Flu vaccine (1) Never done    Pneumococcal 65+ years Vaccine (1 of 1 - PCV) 09/16/2026 (Originally 7/6/2007)    Depression Screen  09/21/2024    DEXA (modify frequency per FRAX score)  Addressed    Hepatitis A vaccine  Aged Out    Hepatitis B vaccine  Aged Out    Hib vaccine  Aged Out    Polio vaccine  Aged Out    Meningococcal (ACWY) vaccine  Aged Out    Breast cancer screen  Discontinued    Hepatitis C screen  Discontinued             
  No follow-ups on file.     Subjective     No changes to health except recent retinal surgery. Doing well with no concerns.     Patient's complete Health Risk Assessment and screening values have been reviewed and are found in Flowsheets. The following problems were reviewed today and where indicated follow up appointments were made and/or referrals ordered.    Positive Risk Factor Screenings with Interventions:                Inactivity:  On average, how many days per week do you engage in moderate to strenuous exercise (like a brisk walk)?: 0 days (!) Abnormal  On average, how many minutes do you engage in exercise at this level?: 0 min  Interventions:  Patient declined any further interventions or treatment    Poor Eating Habits/Diet:  Do you eat balanced/healthy meals regularly?: (!) No  Interventions:  Patient declines any further evaluation or treatment    Abnormal BMI (obese):  Body mass index is 38.65 kg/m². (!) Abnormal  Interventions:  Patient declines any further evaluation or treatment                           Objective   Vitals:    08/05/24 1341   BP: 132/74   Site: Left Upper Arm   Position: Sitting   Cuff Size: Large Adult   Pulse: 77   Temp: 98.6 °F (37 °C)   TempSrc: Tympanic   SpO2: 97%   Weight: 99 kg (218 lb 3.2 oz)   Height: 1.6 m (5' 3\")      Body mass index is 38.65 kg/m².        General Appearance: alert and oriented to person, place and time, well-developed and well-nourished, in no acute distress  Pulmonary/Chest: clear to auscultation bilaterally- no wheezes, rales or rhonchi, normal air movement, no respiratory distress  Cardiovascular: normal rate, normal S1 and S2, no gallops, intact distal pulses, and no carotid bruits  Extremities: no cyanosis, no clubbing, and no edema  Neurologic: gait and coordination normal and speech normal     No thyromegaly or nodules noted       Allergies   Allergen Reactions    Valium [Diazepam]      Prior to Visit Medications    Medication Sig Taking?

## 2024-08-06 DIAGNOSIS — E03.9 ACQUIRED HYPOTHYROIDISM: Primary | ICD-10-CM

## 2024-08-06 LAB — T4 FREE: 0.45 NG/DL (ref 0.71–1.85)

## 2024-08-06 RX ORDER — THYROID 90 MG/1
90 TABLET ORAL DAILY
Qty: 30 TABLET | Refills: 2 | Status: SHIPPED | OUTPATIENT
Start: 2024-08-06

## 2024-11-10 DIAGNOSIS — E03.9 ACQUIRED HYPOTHYROIDISM: ICD-10-CM

## 2024-11-10 RX ORDER — LEVOTHYROXINE, LIOTHYRONINE 57; 13.5 UG/1; UG/1
90 TABLET ORAL DAILY
Qty: 30 TABLET | Refills: 0 | Status: SHIPPED | OUTPATIENT
Start: 2024-11-10

## 2024-12-08 DIAGNOSIS — E03.9 ACQUIRED HYPOTHYROIDISM: ICD-10-CM

## 2024-12-08 RX ORDER — LEVOTHYROXINE, LIOTHYRONINE 57; 13.5 UG/1; UG/1
90 TABLET ORAL DAILY
Qty: 30 TABLET | Refills: 0 | Status: SHIPPED | OUTPATIENT
Start: 2024-12-08

## 2024-12-10 DIAGNOSIS — E03.9 ACQUIRED HYPOTHYROIDISM: ICD-10-CM

## 2024-12-11 RX ORDER — LEVOTHYROXINE, LIOTHYRONINE 57; 13.5 UG/1; UG/1
90 TABLET ORAL DAILY
Qty: 30 TABLET | Refills: 0 | OUTPATIENT
Start: 2024-12-11

## 2024-12-27 RX ORDER — IBUPROFEN 800 MG/1
800 TABLET, FILM COATED ORAL EVERY 12 HOURS PRN
Qty: 60 TABLET | Refills: 3 | Status: SHIPPED | OUTPATIENT
Start: 2024-12-27

## 2025-01-04 DIAGNOSIS — E03.9 ACQUIRED HYPOTHYROIDISM: ICD-10-CM

## 2025-01-05 RX ORDER — LEVOTHYROXINE, LIOTHYRONINE 57; 13.5 UG/1; UG/1
90 TABLET ORAL DAILY
Qty: 30 TABLET | Refills: 0 | Status: SHIPPED | OUTPATIENT
Start: 2025-01-05

## 2025-02-02 DIAGNOSIS — E03.9 ACQUIRED HYPOTHYROIDISM: ICD-10-CM

## 2025-02-02 RX ORDER — LEVOTHYROXINE, LIOTHYRONINE 57; 13.5 UG/1; UG/1
90 TABLET ORAL DAILY
Qty: 30 TABLET | Refills: 0 | OUTPATIENT
Start: 2025-02-02

## 2025-03-18 DIAGNOSIS — E03.9 ACQUIRED HYPOTHYROIDISM: ICD-10-CM

## 2025-03-18 RX ORDER — LEVOTHYROXINE, LIOTHYRONINE 57; 13.5 UG/1; UG/1
90 TABLET ORAL DAILY
Qty: 30 TABLET | Refills: 0 | Status: SHIPPED | OUTPATIENT
Start: 2025-03-18

## 2025-03-19 DIAGNOSIS — E03.9 ACQUIRED HYPOTHYROIDISM: ICD-10-CM

## 2025-03-19 RX ORDER — THYROID 90 MG/1
90 TABLET ORAL DAILY
Qty: 30 TABLET | Refills: 0 | OUTPATIENT
Start: 2025-03-19

## 2025-04-14 ENCOUNTER — HOSPITAL ENCOUNTER (OUTPATIENT)
Age: 85
Setting detail: SPECIMEN
Discharge: HOME | End: 2025-04-14

## 2025-04-14 ENCOUNTER — RESULTS FOLLOW-UP (OUTPATIENT)
Dept: FAMILY MEDICINE CLINIC | Age: 85
End: 2025-04-14

## 2025-04-14 DIAGNOSIS — E03.9 ACQUIRED HYPOTHYROIDISM: ICD-10-CM

## 2025-04-14 LAB
T3FREE SERPL-MCNC: 3.65 PG/ML (ref 2–4.4)
T4 FREE SERPL-MCNC: 0.6 NG/DL (ref 0.92–1.68)
TSH SERPL DL<=0.05 MIU/L-ACNC: 14.1 UIU/ML (ref 0.27–4.2)

## 2025-04-16 ENCOUNTER — OFFICE VISIT (OUTPATIENT)
Dept: FAMILY MEDICINE CLINIC | Age: 85
End: 2025-04-16
Payer: MEDICARE

## 2025-04-16 VITALS
HEIGHT: 63 IN | DIASTOLIC BLOOD PRESSURE: 60 MMHG | TEMPERATURE: 97.2 F | BODY MASS INDEX: 37.17 KG/M2 | WEIGHT: 209.8 LBS | SYSTOLIC BLOOD PRESSURE: 122 MMHG | HEART RATE: 88 BPM | OXYGEN SATURATION: 98 %

## 2025-04-16 DIAGNOSIS — E03.9 ACQUIRED HYPOTHYROIDISM: ICD-10-CM

## 2025-04-16 DIAGNOSIS — Z00.00 MEDICARE ANNUAL WELLNESS VISIT, SUBSEQUENT: Primary | ICD-10-CM

## 2025-04-16 PROCEDURE — G0439 PPPS, SUBSEQ VISIT: HCPCS | Performed by: NURSE PRACTITIONER

## 2025-04-16 PROCEDURE — 1123F ACP DISCUSS/DSCN MKR DOCD: CPT | Performed by: NURSE PRACTITIONER

## 2025-04-16 PROCEDURE — 1160F RVW MEDS BY RX/DR IN RCRD: CPT | Performed by: NURSE PRACTITIONER

## 2025-04-16 PROCEDURE — 1159F MED LIST DOCD IN RCRD: CPT | Performed by: NURSE PRACTITIONER

## 2025-04-16 RX ORDER — THYROID 120 MG/1
120 TABLET ORAL DAILY
Qty: 30 TABLET | Refills: 2 | Status: SHIPPED | OUTPATIENT
Start: 2025-04-16

## 2025-04-16 RX ORDER — THYROID,PORK 90 MG
90 TABLET ORAL DAILY
Qty: 30 TABLET | Refills: 0 | OUTPATIENT
Start: 2025-04-16

## 2025-04-16 SDOH — ECONOMIC STABILITY: FOOD INSECURITY: WITHIN THE PAST 12 MONTHS, THE FOOD YOU BOUGHT JUST DIDN'T LAST AND YOU DIDN'T HAVE MONEY TO GET MORE.: NEVER TRUE

## 2025-04-16 SDOH — ECONOMIC STABILITY: FOOD INSECURITY: WITHIN THE PAST 12 MONTHS, YOU WORRIED THAT YOUR FOOD WOULD RUN OUT BEFORE YOU GOT MONEY TO BUY MORE.: NEVER TRUE

## 2025-04-16 ASSESSMENT — PATIENT HEALTH QUESTIONNAIRE - PHQ9
SUM OF ALL RESPONSES TO PHQ QUESTIONS 1-9: 0
SUM OF ALL RESPONSES TO PHQ QUESTIONS 1-9: 0
1. LITTLE INTEREST OR PLEASURE IN DOING THINGS: NOT AT ALL
SUM OF ALL RESPONSES TO PHQ QUESTIONS 1-9: 0
2. FEELING DOWN, DEPRESSED OR HOPELESS: NOT AT ALL
SUM OF ALL RESPONSES TO PHQ QUESTIONS 1-9: 0

## 2025-04-16 ASSESSMENT — LIFESTYLE VARIABLES
HOW OFTEN DO YOU HAVE A DRINK CONTAINING ALCOHOL: NEVER
HOW MANY STANDARD DRINKS CONTAINING ALCOHOL DO YOU HAVE ON A TYPICAL DAY: PATIENT DOES NOT DRINK

## 2025-04-16 NOTE — TELEPHONE ENCOUNTER
Last Visit:  8/5/2024     Next Visit Date:  Future Appointments   Date Time Provider Department Center   4/16/2025  1:30 PM Eloisa Mesa, JEFERSON - CNP [facility]  [facility]

## 2025-04-16 NOTE — PROGRESS NOTES
Visit Information    Have you changed or started any medications since your last visit including any over-the-counter medicines, vitamins, or herbal medicines? no   Have you stopped taking any of your medications? Is so, why? -  no  Are you having any side effects from any of your medications? - no    Have you seen any other physician or provider since your last visit?  no   Have you had any other diagnostic tests since your last visit?  no   Have you been seen in the emergency room and/or had an admission in a hospital since we last saw you?  no   Have you had your routine dental cleaning in the past 6 months?  no     Do you have an active MyChart account? If no, what is the barrier?  Yes    Patient Care Team:  Eloisa Mesa APRN - CNP as PCP - General  Eloisa Mesa APRN - CNP as PCP - Empaneled Provider    Medical History Review  Past Medical, Family, and Social History reviewed and  contribute to the patient presenting condition    Health Maintenance   Topic Date Due    DTaP/Tdap/Td vaccine (1 - Tdap) Never done    COVID-19 Vaccine (1 - 2024-25 season) Never done    Annual Wellness Visit (Medicare Advantage)  01/01/2025    Flu vaccine (Season Ended) 08/05/2025 (Originally 8/1/2025)    Shingles vaccine (1 of 2) 08/05/2025 (Originally 7/6/1992)    Respiratory Syncytial Virus (RSV) Pregnant or age 60 yrs+ (1 - 1-dose 75+ series) 08/05/2025 (Originally 7/6/2017)    Pneumococcal 50+ years Vaccine (1 of 1 - PCV) 09/16/2026 (Originally 7/6/1992)    Depression Screen  08/05/2025    DEXA (modify frequency per FRAX score)  Addressed    Hepatitis A vaccine  Aged Out    Hepatitis B vaccine  Aged Out    Hib vaccine  Aged Out    Polio vaccine  Aged Out    Meningococcal (ACWY) vaccine  Aged Out    Meningococcal B vaccine  Aged Out    Breast cancer screen  Discontinued    Hepatitis C screen  Discontinued

## 2025-04-16 NOTE — PROGRESS NOTES
Medicare Annual Wellness Visit    Felix Hill is here for Medicare AWV and Discuss Labs (Thyroid labs)    Assessment & Plan   Medicare annual wellness visit, subsequent  Overall she is doing well  Declines dexa scan, calcium/vit d advised daily and regular weight bearing exercises    Acquired hypothyroidism  Uncontrolled but improved  but still only having BM 2 times per week  Increase dose to 120 mg daily and recheck new labs in 6-8 weeks  Take daily on same time each day at empty stomach    -     thyroid (NP THYROID) 120 MG tablet; Take 1 tablet by mouth daily, Disp-30 tablet, R-2PLEASE FILL ASAP, PT. [place]  -     TSH; Future  -     T4, Free; Future  -     T3, Free; Future    Lab Results   Component Value Date    TSH 14.10 (H) 04/14/2025     Wt Readings from Last 3 Encounters:   04/16/25 95.2 kg (209 lb 12.8 oz)   08/05/24 99 kg (218 lb 3.2 oz)   12/13/23 98.4 kg (217 lb)          No follow-ups on file.     Subjective   Would like to review recent thyroid labs. No other changes to health.     Patient's complete Health Risk Assessment and screening values have been reviewed and are found in Flowsheets. The following problems were reviewed today and where indicated follow up appointments were made and/or referrals ordered.    Positive Risk Factor Screenings with Interventions:             General HRA Questions:  Select all that apply: (!) New or Increased Pain (stiff neck)  Interventions - Pain:  Patient comments: STIFF NECK BUT HAS BEEN IMPROVING over past 1 week, using vicks  See above      Inactivity:  On average, how many days per week do you engage in moderate to strenuous exercise (like a brisk walk)?: 0 days (!) Abnormal  On average, how many minutes do you engage in exercise at this level?: 0 min  Interventions:  Patient declined any further interventions or treatment     Abnormal BMI (obese):  Body mass index is 37.16 kg/m². (!) Abnormal  Interventions:  Patient declines any

## 2025-07-12 DIAGNOSIS — E03.9 ACQUIRED HYPOTHYROIDISM: ICD-10-CM

## 2025-07-13 RX ORDER — LEVOTHYROXINE, LIOTHYRONINE 76; 18 UG/1; UG/1
120 TABLET ORAL DAILY
Qty: 30 TABLET | Refills: 0 | Status: SHIPPED | OUTPATIENT
Start: 2025-07-13

## 2025-08-11 DIAGNOSIS — E03.9 ACQUIRED HYPOTHYROIDISM: ICD-10-CM

## 2025-08-11 RX ORDER — LEVOTHYROXINE, LIOTHYRONINE 76; 18 UG/1; UG/1
120 TABLET ORAL DAILY
Qty: 30 TABLET | Refills: 0 | Status: SHIPPED | OUTPATIENT
Start: 2025-08-11

## 2025-08-18 ENCOUNTER — HOSPITAL ENCOUNTER (OUTPATIENT)
Age: 85
Setting detail: SPECIMEN
Discharge: HOME | End: 2025-08-18

## 2025-08-18 DIAGNOSIS — E03.9 ACQUIRED HYPOTHYROIDISM: ICD-10-CM

## 2025-08-18 LAB
T3FREE SERPL-MCNC: 2.2 PG/ML (ref 2–4.4)
T4 FREE SERPL-MCNC: 0.6 NG/DL (ref 0.92–1.68)
TSH SERPL DL<=0.05 MIU/L-ACNC: 6.56 UIU/ML (ref 0.27–4.2)